# Patient Record
Sex: FEMALE | Race: WHITE | URBAN - METROPOLITAN AREA
[De-identification: names, ages, dates, MRNs, and addresses within clinical notes are randomized per-mention and may not be internally consistent; named-entity substitution may affect disease eponyms.]

---

## 2021-07-22 ENCOUNTER — OFFICE VISIT (OUTPATIENT)
Dept: URBAN - METROPOLITAN AREA CLINIC 64 | Facility: CLINIC | Age: 31
End: 2021-07-22
Payer: COMMERCIAL

## 2021-07-22 DIAGNOSIS — G43.011 MIGRAINE WITHOUT AURA, INTRACTABLE, WITH STATUS MIGRAINOSUS: Primary | ICD-10-CM

## 2021-07-22 DIAGNOSIS — H53.2 DIPLOPIA: ICD-10-CM

## 2021-07-22 PROCEDURE — 99203 OFFICE O/P NEW LOW 30 MIN: CPT | Performed by: OPTOMETRIST

## 2021-07-22 ASSESSMENT — INTRAOCULAR PRESSURE
OS: 14
OD: 16

## 2021-07-22 NOTE — IMPRESSION/PLAN
Impression: Diplopia: H53.2. Plan: Lasted seconds. Ocurred one time. No tx needed. RTC if recurrent.

## 2021-07-22 NOTE — IMPRESSION/PLAN
Impression: Migraine without aura, intractable, with status migrainosus: G43.011. Plan: Disc. Healthy eye exam.  No further tx needed.

## 2022-01-28 ENCOUNTER — OFFICE VISIT (OUTPATIENT)
Dept: URBAN - METROPOLITAN AREA CLINIC 64 | Facility: CLINIC | Age: 32
End: 2022-01-28
Payer: COMMERCIAL

## 2022-01-28 PROCEDURE — 99213 OFFICE O/P EST LOW 20 MIN: CPT | Performed by: OPTOMETRIST

## 2022-01-28 NOTE — IMPRESSION/PLAN
Impression: Diplopia: H53.2. Plan: Lasts less than a minute. Has occurred 7 times since last seen in July, 4 times in December. Pt's MRI came back WNL. Refer to [facility] ophthalmologist for further diagnostics.